# Patient Record
(demographics unavailable — no encounter records)

---

## 2024-10-17 NOTE — REVIEW OF SYSTEMS
[Negative] : Constitutional [FreeTextEntry5] : PVD [de-identified] : thick, hard skin [de-identified] : neuropathy

## 2024-10-17 NOTE — HISTORY OF PRESENT ILLNESS
[FreeTextEntry1] : Obinna is a 64-year-old male who presents to our office for continued diabetic foot care.  His wife noticed a black spot on his foot and was concerned about gangrene.  He has no pain but suffers with profound neuropathy.

## 2024-10-17 NOTE — PROCEDURE
[FreeTextEntry1] : Plan:   Examination.  (Fu=[id number]).  We had a lengthy discussion concerning the patient's condition.  Surgical debridement of the affected skin area exposing pink, healthy skin.  Lotion was applied and we stressed the need to apply it daily.  We reviewed diabetic precautions at length. Patient to return: 1month.

## 2024-10-17 NOTE — PHYSICAL EXAM
[FreeTextEntry3] :  Vascular Exam: DP Pulse (left): Absent. DP Pulse (right): Absent. PT Pulse (left): Absent. PT Pulse (right): Absent. Capillary Return - L: Delayed. Capillary Return - R: Delayed. Temperature Grad - L: Warm to Cool. Temperature Grad - R: Warm to Cool. [de-identified] : Orthopedic Exam:  Patient presents with mild bunion and hammertoe deformities on both feet.  No discomfort upon examination at this time. [FreeTextEntry1] : Neurological Exam:  Achilles reflex absent, Patella reflex absent, sharp dull absent, light touch/pressure absent, hot/cold absent, vibratory absent, [place]-Prashanth hallux, 5th digit, 3rd met head, heel absent.

## 2024-11-18 NOTE — HISTORY OF PRESENT ILLNESS
[FreeTextEntry1] : Obinna is a 64-year-old male who presents to our office this morning for continued diabetic foot care.  He develops thick hard skin in areas where he had previous wounds.  He has no pain but suffers with profound neuropathy.

## 2024-11-18 NOTE — PHYSICAL EXAM
[FreeTextEntry3] :  Vascular Exam: DP Pulse (left): Absent. DP Pulse (right): Absent. PT Pulse (left): Absent. PT Pulse (right): Absent. Capillary Return - L: Delayed. Capillary Return - R: Delayed. Temperature Grad - L: Warm to Cool. Temperature Grad - R: Warm to Cool. [de-identified] : Orthopedic Exam:  Patient presents with mild bunion and hammertoe deformities on both feet.  No discomfort upon examination at this time. [FreeTextEntry1] : Neurological Exam:  Achilles reflex absent, Patella reflex absent, sharp dull absent, light touch/pressure absent, hot/cold absent, vibratory absent, [place]-Prashanth hallux, 5th digit, 3rd met head, heel absent.

## 2024-11-18 NOTE — PROCEDURE
[FreeTextEntry1] : Plan:   Examination.  (De=[id number]).  We had a lengthy discussion concerning the patient's condition.  Surgical debridement of the affected skin area exposing pink, healthy skin.  Lotion was applied and we stressed the need to apply it daily.  We reviewed diabetic precautions at length. Patient to return: 1month.

## 2024-11-18 NOTE — REVIEW OF SYSTEMS
[Negative] : Constitutional [FreeTextEntry5] : PVD [de-identified] : thick, hard skin [de-identified] : neuropathy

## 2024-12-20 NOTE — HISTORY OF PRESENT ILLNESS
[FreeTextEntry1] : Obinna is a 65-year-old male who presents complaining of a wound on his toe.  He was decorating for [place] when he banged his toe.  His wife thinks its infected.

## 2024-12-20 NOTE — REVIEW OF SYSTEMS
[Negative] : Constitutional [FreeTextEntry5] : PVD [de-identified] : thick, hard skin, wounds [de-identified] : neuropathy

## 2024-12-20 NOTE — PROCEDURE
[FreeTextEntry1] : Plan:   Examination.  (Vw=[id number]).  We had a lengthy discussion concerning the patient's condition.  Surgical debridement of the wound site distal 4th toe left removing necrotic tissue.  Dressed with Betadine, Neosporin and a dressing.  Patient to apply Neosporin and a dressing daily. Patient to return: 2 weeks.

## 2024-12-20 NOTE — PHYSICAL EXAM
[de-identified] : Orthopedic Exam:  Patient presents with mild bunion and hammertoe deformities on both feet. [FreeTextEntry2] : 0.4 [FreeTextEntry3] : 0.2 [FreeTextEntry4] : 0.2 cm [de-identified] : necrotic [TWNoteComboBox1] : Left [TWNoteComboBox2] : 1 [TWNoteComboBox4] : None [TWNoteComboBox6] : Traumatic [de-identified] : No [de-identified] : other [de-identified] : None [de-identified] : <20% [de-identified] : 50% [TWNoteComboBox7] : [place] [de-identified] : Debridement performed of all devitalized tissue to bleeding viable tissue [FreeTextEntry1] : Neurological Exam:  Achilles reflex absent, Patella reflex absent, sharp dull absent, light touch/pressure absent, hot/cold absent, vibratory absent, [place]-Prashanth hallux, 5th digit, 3rd met head, heel absent.

## 2025-01-31 NOTE — PHYSICAL EXAM
[FreeTextEntry3] : Vascular Exam: DP Pulse (left): Absent. DP Pulse (right): Absent. PT Pulse (left): Absent. PT Pulse (right): Absent. Capillary Return - L: Delayed. Capillary Return - R: Delayed. Temperature Grad - L: Warm to Cool. Temperature Grad - R: Warm to Cool. [de-identified] : Orthopedic Exam:  Patient presents with nonpainful mild bunion and hammertoe deformities on both feet. [FreeTextEntry1] : Neurological Exam:  Achilles reflex absent, Patella reflex absent, sharp dull absent, light touch/pressure absent, hot/cold absent, vibratory absent, [place]-Prashanth hallux, 5th digit, 3rd met head, heel absent.

## 2025-01-31 NOTE — REVIEW OF SYSTEMS
[Negative] : Constitutional [FreeTextEntry5] : PVD [de-identified] : thick, hard skin, wounds [de-identified] : neuropathy

## 2025-01-31 NOTE — HISTORY OF PRESENT ILLNESS
[FreeTextEntry1] : Obinna is a 65-year-old male who presents this morning for follow up care of a wound on his toe.  He did his wound care for a week then stopped.  He thought it was resolved.

## 2025-01-31 NOTE — PROCEDURE
[FreeTextEntry1] : Plan:   Examination.  (Vg=[id number]).  Surgical debridement of the previous wound site distal 4th toe left exposing pink, healthy skin.  It has resolved.  D/c wound care.  We discussed precautions to avoid recurrence. Patient to return: 2 months

## 2025-02-28 NOTE — PROCEDURE
[FreeTextEntry1] : Plan:   Examination.  (Vs=[id number]).  Surgical debridement of the lesion on the distal 4th toe left exposing pink, healthy skin.  The wound has not returned.  Patient is to apply lotion to this area. Patient to return: 2 months

## 2025-02-28 NOTE — PHYSICAL EXAM
[FreeTextEntry3] : Vascular Exam: DP Pulse (left): Absent. DP Pulse (right): Absent. PT Pulse (left): Absent. PT Pulse (right): Absent. Capillary Return - L: Delayed. Capillary Return - R: Delayed. Temperature Grad - L: Warm to Cool. Temperature Grad - R: Warm to Cool. [de-identified] : Orthopedic Exam:  Patient presents with nonpainful mild bunion and hammertoe deformities on both feet. [FreeTextEntry1] : Neurological Exam:  Achilles reflex absent, Patella reflex absent, sharp dull absent, light touch/pressure absent, hot/cold absent, vibratory absent, [place]-Prashanth hallux, 5th digit, 3rd met head, heel absent.

## 2025-02-28 NOTE — HISTORY OF PRESENT ILLNESS
[FreeTextEntry1] : Obinna is a 65-year-old male who presents to our office for diabetic foot care.  He was starting to have discomfort where he previously had a wound on his toe.  He was afraid it was returning.

## 2025-02-28 NOTE — REVIEW OF SYSTEMS
[Negative] : Constitutional [FreeTextEntry5] : PVD [de-identified] : thick, hard skin, wounds [de-identified] : neuropathy

## 2025-03-28 NOTE — PHYSICAL EXAM
[FreeTextEntry3] : Class findings (Q8) indicated due to abs L posterior tibial pulse, abs R posterior tibial pulse, abs L dorsalis pedis pulse, abs R dorsalis pedis pulse, hair growth decreased or absent, nail changes (thickening), pigmentary changes (discoloration), skin texture L (thin, shiny), skin texture R (thin, shiny) and temperature changes.  Skin intact, no infection.       [FreeTextEntry1] : Dermatological Exam:  Patient presents with a thick, hard corns plantar to the 1st metatarsal head left foot, plantar to the 1st metatarsal head right foot, plantar to the 5th metatarsal right foot and on the distal aspect of the 4th toe left.  Callus formation is located on the medial right heel and the medial left heel.  Left 1st toenail and the right 1st toenail are mycotic.  They are thick, discolored and incurvated.  Left 2nd toenail, left 3rd toenail, left 4th toenail, left 5th toenail, right 2nd toenail, right 3rd toenail, right 4th toenail and right 5th toenail are elongated.  No clinical signs of infection.

## 2025-03-28 NOTE — REVIEW OF SYSTEMS
[Negative] : Musculoskeletal [FreeTextEntry5] : PVD [de-identified] : thick, hard skin, wounds [de-identified] : neuropathy

## 2025-03-28 NOTE — PROCEDURE
[FreeTextEntry1] : Plan:  Plantar to the 1st metatarsal head left foot, plantar to the 1st metatarsal head right foot, plantar to the 5th metatarsal right foot,the distal aspect of the 4th toe left, the medial right heel and the medial left heel.  Hyperkeratotic lesions were debrided. (Lt=[id number]).  Left 1st toenail and the right 1st toenail.  Mycotic toenails were debrided by manual means and electric  to patient tolerance. (Ao=[id number]).  Left 2nd toenail, left 3rd toenail, left 4th toenail, left 5th toenail, right 2nd toenail, right 3rd toenail, right 4th toenail and right 5th toenail.  Elongated toenails were trimmed. (Rh=[id number]).  We discussed diabetic precautions. Patient is to apply lotion to his feet daily. Patient to return: 9 weeks.

## 2025-03-28 NOTE — HISTORY OF PRESENT ILLNESS
[FreeTextEntry1] : Obinna is a 65-year-old male who presents to our office this morning for continued diabetic foot care.  He complains of thick hard lesions, toenails he can't cut and long toenails.  He has a history of wounds. Callus pain: subject to breakdown.  Corn pain; subject to breakdown.  Mycotic nail pain: in shoes.  Elongated nail pain: none. He is under the care of Sohail Pineda. Date last seen:

## 2025-04-30 NOTE — REVIEW OF SYSTEMS
[Negative] : Musculoskeletal [FreeTextEntry5] : PVD [de-identified] : thick, hard skin, wounds [de-identified] : neuropathy

## 2025-04-30 NOTE — PHYSICAL EXAM
[General Appearance - Alert] : alert [General Appearance - In No Acute Distress] : in no acute distress [General Appearance - Well Nourished] : well nourished [General Appearance - Well-Appearing] : healthy appearing [FreeTextEntry3] : Vascular Exam: DP Pulse (left): Absent. DP Pulse (right): Absent. PT Pulse (left): Absent. PT Pulse (right): Absent. Capillary Return - L: Delayed. Capillary Return - R: Delayed. Temperature Grad - L: Warm to Cool. Temperature Grad - R: Warm to Cool. [de-identified] : Orthopedic Exam:  Patient presents with nonpainful mild bunion and hammertoe deformities on both feet. [FreeTextEntry1] : Neurological Exam:  Achilles reflex absent, Patella reflex absent, sharp dull absent, light touch/pressure absent, hot/cold absent, vibratory absent, [place]-Prashanth hallux, 5th digit, 3rd met head, heel absent.

## 2025-04-30 NOTE — PROCEDURE
[FreeTextEntry1] : Plan:   Examination.  (Vv=02306).  Surgical debridement of the lesion exposing pink, healthy skin.  The wound has not returned.  Patient is to apply lotion to this area and wear soft innersoles. Patient to return: 2 months

## 2025-04-30 NOTE — HISTORY OF PRESENT ILLNESS
[FreeTextEntry1] : Obinna is a 65-year-old diabetic male with a history of wounds who presents today complaining of roughness on the bottom of his foot.  He is still not seeing well. He also has limited feeling dure to his neuropathy.  His wife saw a spot on his sock.

## 2025-05-29 NOTE — REVIEW OF SYSTEMS
[Negative] : Musculoskeletal [FreeTextEntry5] : PVD [de-identified] : thick, hard skin, wounds [de-identified] : neuropathy

## 2025-05-29 NOTE — PHYSICAL EXAM
[FreeTextEntry3] : Class findings (Q8) indicated due to abs L posterior tibial pulse, abs R posterior tibial pulse, abs L dorsalis pedis pulse, abs R dorsalis pedis pulse, hair growth decreased or absent, nail changes (thickening), pigmentary changes (discoloration), skin texture L (thin, shiny), skin texture R (thin, shiny) and temperature changes.  Skin intact, no infection.       [FreeTextEntry1] : Dermatological Exam:  Corns are located plantar to the 1st metatarsal head left foot, plantar to the 1st metatarsal head right foot, plantar to the 5th metatarsal right foot and on the distal aspect of the 4th toe left.  Callus formation is located on the medial right heel and the medial left heel.  Left 1st toenail and the right 1st toenail are mycotic.  They are thick, discolored and incurvated.  Left 2nd toenail, left 3rd toenail, left 4th toenail, left 5th toenail, right 2nd toenail, right 3rd toenail, right 4th toenail and right 5th toenail are elongated.  No clinical signs of infection.

## 2025-05-29 NOTE — PROCEDURE
[FreeTextEntry1] : Plan:  Plantar to the 1st metatarsal head left foot, plantar to the 1st metatarsal head right foot, plantar to the 5th metatarsal right foot,the distal aspect of the 4th toe left, the medial right heel and the medial left heel.  Hyperkeratotic lesions were debrided. (Rp=[id number]).  Left 1st toenail and the right 1st toenail.  Mycotic toenails were debrided by manual means and electric  to patient tolerance. (Qe=66731).  Left 2nd toenail, left 3rd toenail, left 4th toenail, left 5th toenail, right 2nd toenail, right 3rd toenail, right 4th toenail and right 5th toenail.  Elongated toenails were trimmed. (Ll=[id number]).  We discussed diabetic precautions. Patient is to apply lotion to his feet daily. Patient to return: 9 weeks.

## 2025-05-29 NOTE — HISTORY OF PRESENT ILLNESS
[FreeTextEntry1] : Obinna is a 65-year-old male who presents this morning for continued diabetic foot care.  He complains of thick hard lesions, toenails he can't cut and long toenails.  He has a history of wounds. Callus pain: subject to breakdown.  Corn pain; subject to breakdown.  Mycotic nail pain: in shoes.  Elongated nail pain: none. He is under the care of Sohail Pineda. Date last seen: 03/28/25.

## 2025-07-07 NOTE — PROCEDURE
[FreeTextEntry1] : Plan:  Examination.  (Wr=[id number]).  We had a lengthy discussion concerning the patient's condition.  Surgical debridement of the lesion exposing pink, healthy skin.  We reassured the patient there is no wound.  Patient is to apply lotion to this area and wear soft innersoles. Patient to return: 2 months

## 2025-07-07 NOTE — HISTORY OF PRESENT ILLNESS
[FreeTextEntry1] : Obinna is a 65-year-old diabetic male with a history of wounds who presents to the office complaining of roughness on the bottom of his foot.  He has profound neuropathy and vision loss.   His wife is concerned of wounds.

## 2025-07-07 NOTE — PHYSICAL EXAM
[General Appearance - Alert] : alert [General Appearance - In No Acute Distress] : in no acute distress [General Appearance - Well Nourished] : well nourished [General Appearance - Well-Appearing] : healthy appearing [FreeTextEntry3] : Vascular Exam: DP Pulse (left): Absent. DP Pulse (right): Absent. PT Pulse (left): Absent. PT Pulse (right): Absent. Capillary Return - L: Delayed. Capillary Return - R: Delayed. Temperature Grad - L: Warm to Cool. Temperature Grad - R: Warm to Cool. [de-identified] : Orthopedic Exam:  Patient presents with nonpainful mild bunion and hammertoe deformities on both feet. [FreeTextEntry1] : Neurological Exam:  Achilles reflex absent, Patella reflex absent, sharp dull absent, light touch/pressure absent, hot/cold absent, vibratory absent, [place]-Prashanth hallux, 5th digit, 3rd met head, heel absent.

## 2025-07-07 NOTE — REVIEW OF SYSTEMS
[Negative] : Musculoskeletal [FreeTextEntry5] : PVD [de-identified] : thick, hard skin, wounds [de-identified] : neuropathy